# Patient Record
Sex: MALE | Race: WHITE | Employment: FULL TIME | URBAN - METROPOLITAN AREA
[De-identification: names, ages, dates, MRNs, and addresses within clinical notes are randomized per-mention and may not be internally consistent; named-entity substitution may affect disease eponyms.]

---

## 2017-04-12 ENCOUNTER — HOSPITAL ENCOUNTER (OUTPATIENT)
Age: 22
Discharge: HOME | End: 2017-04-12
Payer: MEDICAID

## 2017-04-12 LAB
ABSOLUTE EOS #: 0.5 K/UL (ref 0–0.4)
ABSOLUTE LYMPH #: 2 K/UL (ref 1–4.8)
ABSOLUTE MONO #: 0.6 K/UL (ref 0.1–1.2)
ALBUMIN SERPL-MCNC: 4.5 G/DL (ref 3.5–5.2)
ALBUMIN/GLOBULIN RATIO: 1.4 (ref 1–2.5)
ALP BLD-CCNC: 70 U/L (ref 40–129)
ALT SERPL-CCNC: 49 U/L (ref 5–41)
ANION GAP SERPL CALCULATED.3IONS-SCNC: 13 MMOL/L (ref 9–17)
AST SERPL-CCNC: 31 U/L
BASOPHILS # BLD: 1 % (ref 0–2)
BASOPHILS ABSOLUTE: 0 K/UL (ref 0–0.2)
BILIRUB SERPL-MCNC: 0.34 MG/DL (ref 0.3–1.2)
BUN BLDV-MCNC: 10 MG/DL (ref 6–20)
BUN/CREAT BLD: ABNORMAL (ref 9–20)
CALCIUM SERPL-MCNC: 9.4 MG/DL (ref 8.6–10.4)
CHLORIDE BLD-SCNC: 98 MMOL/L (ref 98–107)
CHOLESTEROL/HDL RATIO: 3.1
CHOLESTEROL: 178 MG/DL
CO2: 28 MMOL/L (ref 20–31)
CREAT SERPL-MCNC: 0.83 MG/DL (ref 0.7–1.2)
DIFFERENTIAL TYPE: ABNORMAL
EOSINOPHILS RELATIVE PERCENT: 8 % (ref 1–4)
GFR AFRICAN AMERICAN: >60 ML/MIN
GFR NON-AFRICAN AMERICAN: >60 ML/MIN
GFR SERPL CREATININE-BSD FRML MDRD: ABNORMAL ML/MIN/{1.73_M2}
GFR SERPL CREATININE-BSD FRML MDRD: ABNORMAL ML/MIN/{1.73_M2}
GLUCOSE BLD-MCNC: 95 MG/DL (ref 70–99)
HCT VFR BLD CALC: 49.4 % (ref 41–53)
HDLC SERPL-MCNC: 58 MG/DL
HEMOGLOBIN: 16.8 G/DL (ref 13.5–17.5)
LDL CHOLESTEROL: 99 MG/DL (ref 0–130)
LYMPHOCYTES # BLD: 33 % (ref 24–44)
MCH RBC QN AUTO: 31.6 PG (ref 26–34)
MCHC RBC AUTO-ENTMCNC: 34 G/DL (ref 31–37)
MCV RBC AUTO: 93 FL (ref 80–100)
MONOCYTES # BLD: 10 % (ref 2–11)
PDW BLD-RTO: 13.3 % (ref 12.5–15.4)
PLATELET # BLD: 263 K/UL (ref 140–450)
PLATELET ESTIMATE: ABNORMAL
PMV BLD AUTO: 7.3 FL (ref 6–12)
POTASSIUM SERPL-SCNC: 4 MMOL/L (ref 3.7–5.3)
RBC # BLD: 5.31 M/UL (ref 4.5–5.9)
RBC # BLD: ABNORMAL 10*6/UL
SEG NEUTROPHILS: 48 % (ref 36–66)
SEGMENTED NEUTROPHILS ABSOLUTE COUNT: 3.1 K/UL (ref 1.8–7.7)
SODIUM BLD-SCNC: 139 MMOL/L (ref 135–144)
TOTAL PROTEIN: 7.8 G/DL (ref 6.4–8.3)
TRIGL SERPL-MCNC: 107 MG/DL
VLDLC SERPL CALC-MCNC: NORMAL MG/DL (ref 1–30)
WBC # BLD: 6.3 K/UL (ref 3.5–11)
WBC # BLD: ABNORMAL 10*3/UL

## 2017-04-12 PROCEDURE — 36415 COLL VENOUS BLD VENIPUNCTURE: CPT

## 2017-04-12 PROCEDURE — 80061 LIPID PANEL: CPT

## 2017-04-12 PROCEDURE — 80053 COMPREHEN METABOLIC PANEL: CPT

## 2017-04-12 PROCEDURE — 85025 COMPLETE CBC W/AUTO DIFF WBC: CPT

## 2023-08-08 ENCOUNTER — OFFICE VISIT (OUTPATIENT)
Dept: INTERNAL MEDICINE CLINIC | Age: 28
End: 2023-08-08
Payer: COMMERCIAL

## 2023-08-08 VITALS
RESPIRATION RATE: 16 BRPM | HEIGHT: 72 IN | HEART RATE: 71 BPM | DIASTOLIC BLOOD PRESSURE: 82 MMHG | WEIGHT: 221 LBS | SYSTOLIC BLOOD PRESSURE: 131 MMHG | BODY MASS INDEX: 29.93 KG/M2

## 2023-08-08 DIAGNOSIS — Z11.4 ENCOUNTER FOR SCREENING FOR HUMAN IMMUNODEFICIENCY VIRUS (HIV): ICD-10-CM

## 2023-08-08 DIAGNOSIS — F11.20 SEVERE OPIOID USE DISORDER (HCC): Primary | ICD-10-CM

## 2023-08-08 DIAGNOSIS — B19.20 HEPATITIS C VIRUS INFECTION WITHOUT HEPATIC COMA, UNSPECIFIED CHRONICITY: ICD-10-CM

## 2023-08-08 LAB
ALCOHOL URINE: ABNORMAL
AMPHETAMINE SCREEN, URINE: ABNORMAL
BARBITURATE SCREEN, URINE: ABNORMAL
BENZODIAZEPINE SCREEN, URINE: ABNORMAL
BUPRENORPHINE URINE: ABNORMAL
COCAINE METABOLITE SCREEN URINE: ABNORMAL
FENTANYL SCREEN, URINE: ABNORMAL
GABAPENTIN SCREEN, URINE: ABNORMAL
MDMA URINE: ABNORMAL
METHADONE SCREEN, URINE: ABNORMAL
METHAMPHETAMINE, URINE: ABNORMAL
OPIATE SCREEN URINE: ABNORMAL
OXYCODONE SCREEN URINE: ABNORMAL
PHENCYCLIDINE SCREEN URINE: ABNORMAL
PROPOXYPHENE SCREEN, URINE: ABNORMAL
SYNTHETIC CANNABINOIDS(K2) SCREEN, URINE: ABNORMAL
THC SCREEN, URINE: ABNORMAL
TRAMADOL SCREEN URINE: ABNORMAL
TRICYCLIC ANTIDEPRESSANTS, UR: ABNORMAL

## 2023-08-08 PROCEDURE — 4004F PT TOBACCO SCREEN RCVD TLK: CPT | Performed by: NURSE PRACTITIONER

## 2023-08-08 PROCEDURE — 99204 OFFICE O/P NEW MOD 45 MIN: CPT | Performed by: NURSE PRACTITIONER

## 2023-08-08 PROCEDURE — 80305 DRUG TEST PRSMV DIR OPT OBS: CPT | Performed by: NURSE PRACTITIONER

## 2023-08-08 PROCEDURE — G8419 CALC BMI OUT NRM PARAM NOF/U: HCPCS | Performed by: NURSE PRACTITIONER

## 2023-08-08 PROCEDURE — G8427 DOCREV CUR MEDS BY ELIG CLIN: HCPCS | Performed by: NURSE PRACTITIONER

## 2023-08-08 RX ORDER — BUPRENORPHINE AND NALOXONE 8; 2 MG/1; MG/1
1 FILM, SOLUBLE BUCCAL; SUBLINGUAL DAILY
Qty: 7 FILM | Refills: 0 | Status: SHIPPED | OUTPATIENT
Start: 2023-08-08 | End: 2023-08-15

## 2023-08-08 RX ORDER — BUPRENORPHINE AND NALOXONE 8; 2 MG/1; MG/1
1 FILM, SOLUBLE BUCCAL; SUBLINGUAL DAILY
COMMUNITY

## 2023-08-08 RX ORDER — GABAPENTIN 300 MG/1
300 CAPSULE ORAL 3 TIMES DAILY
Qty: 21 CAPSULE | Refills: 0 | Status: SHIPPED | OUTPATIENT
Start: 2023-08-08 | End: 2023-08-15

## 2023-08-08 ASSESSMENT — ENCOUNTER SYMPTOMS
CONSTIPATION: 0
WHEEZING: 0
CHEST TIGHTNESS: 0
COUGH: 0
SHORTNESS OF BREATH: 0
DIARRHEA: 0
NAUSEA: 0
VOMITING: 0
ABDOMINAL PAIN: 0

## 2023-08-08 ASSESSMENT — PATIENT HEALTH QUESTIONNAIRE - PHQ9
5. POOR APPETITE OR OVEREATING: 0
10. IF YOU CHECKED OFF ANY PROBLEMS, HOW DIFFICULT HAVE THESE PROBLEMS MADE IT FOR YOU TO DO YOUR WORK, TAKE CARE OF THINGS AT HOME, OR GET ALONG WITH OTHER PEOPLE: 1
7. TROUBLE CONCENTRATING ON THINGS, SUCH AS READING THE NEWSPAPER OR WATCHING TELEVISION: 0
1. LITTLE INTEREST OR PLEASURE IN DOING THINGS: 2
6. FEELING BAD ABOUT YOURSELF - OR THAT YOU ARE A FAILURE OR HAVE LET YOURSELF OR YOUR FAMILY DOWN: 0
8. MOVING OR SPEAKING SO SLOWLY THAT OTHER PEOPLE COULD HAVE NOTICED. OR THE OPPOSITE, BEING SO FIGETY OR RESTLESS THAT YOU HAVE BEEN MOVING AROUND A LOT MORE THAN USUAL: 3
2. FEELING DOWN, DEPRESSED OR HOPELESS: 1
SUM OF ALL RESPONSES TO PHQ QUESTIONS 1-9: 8
9. THOUGHTS THAT YOU WOULD BE BETTER OFF DEAD, OR OF HURTING YOURSELF: 0
SUM OF ALL RESPONSES TO PHQ9 QUESTIONS 1 & 2: 3
SUM OF ALL RESPONSES TO PHQ QUESTIONS 1-9: 8
3. TROUBLE FALLING OR STAYING ASLEEP: 0
4. FEELING TIRED OR HAVING LITTLE ENERGY: 2

## 2023-08-08 NOTE — PROGRESS NOTES
Verbal order per Alma Escobar CNP for urine drug screen. Positive for BUP. Verified results with Felicia LAI LPN.

## 2023-08-08 NOTE — PROGRESS NOTES
08/08/23   The patients primary provider is BETO Moncada CNP    Chucho Cordova is a 25 y.o.  male who presents in office today for medication assisted treatment. The patient started using opiates at the age of 13. Quantity used daily: 80 dollars  Route of administration: injection  Date and time of last use: 2012  Othersubstances: denies  Prior uses of buprenorphine/naltrexone: taking suboxone 8mg daily for the last several years while incarcerated. Pt states taking sl. Psychiatric history: PTSD, bipolar 2 disorder    Pt states he use to take Elavil and gabapentin prior to incarceration  Hx of tremors and uncontrolled anxiety, pt states he has never been able to sit still and shakes constantly- reports gabapentin helped with this. He does have legs shaking and arms trembling during our conversation. He has tried zoloft with no relief  Pt states he was on several different meds while incarcerated, quit taking them all in 2022    Social hx-denies having any family or friends currently. He currently resides at the [place] [address]. Pt states both his parents and siblings were addicts. His parents passed away in 2019. Pt states he never had much of a relationship with them and hadnt spoken to them in years. He has been in halfway since 2013. He was released briefly in 2017 but went back due to a parole violation. Released this past Friday. He is on parole x 3 years- he does not know what programming or requirements he has to complete yet. Hepatitis hx: known hep C- never received treatment    Discussed at length all Medication Assisted Treatment options. Patient wishes to proceed with treatment of buprenorphine at this time. I allowed opportunity to respond to questions regarding treatment options. Pt would like to start treatment with suboxone. Patient instructed to avoid cannabis, stimulants, and other addictive drugs.   The use of benzodiazepines and other sedative

## 2023-08-10 ENCOUNTER — CLINICAL DOCUMENTATION (OUTPATIENT)
Dept: INTERNAL MEDICINE CLINIC | Age: 28
End: 2023-08-10

## 2023-08-10 NOTE — PROGRESS NOTES
Patient completed new patient screening while sw was out of the office. Sw will meet with patient at next appointment to provide support and community resources.

## 2023-08-14 ENCOUNTER — OFFICE VISIT (OUTPATIENT)
Dept: INTERNAL MEDICINE CLINIC | Age: 28
End: 2023-08-14
Payer: COMMERCIAL

## 2023-08-14 VITALS
SYSTOLIC BLOOD PRESSURE: 164 MMHG | HEART RATE: 112 BPM | HEIGHT: 72 IN | WEIGHT: 230 LBS | BODY MASS INDEX: 31.15 KG/M2 | DIASTOLIC BLOOD PRESSURE: 89 MMHG

## 2023-08-14 DIAGNOSIS — F11.20 SEVERE OPIOID USE DISORDER (HCC): Primary | ICD-10-CM

## 2023-08-14 PROCEDURE — G8427 DOCREV CUR MEDS BY ELIG CLIN: HCPCS | Performed by: NURSE PRACTITIONER

## 2023-08-14 PROCEDURE — 80305 DRUG TEST PRSMV DIR OPT OBS: CPT | Performed by: NURSE PRACTITIONER

## 2023-08-14 PROCEDURE — 4004F PT TOBACCO SCREEN RCVD TLK: CPT | Performed by: NURSE PRACTITIONER

## 2023-08-14 PROCEDURE — G8417 CALC BMI ABV UP PARAM F/U: HCPCS | Performed by: NURSE PRACTITIONER

## 2023-08-14 PROCEDURE — 99214 OFFICE O/P EST MOD 30 MIN: CPT | Performed by: NURSE PRACTITIONER

## 2023-08-14 RX ORDER — BUPRENORPHINE AND NALOXONE 8; 2 MG/1; MG/1
1 FILM, SOLUBLE BUCCAL; SUBLINGUAL DAILY
Qty: 5 FILM | Refills: 0 | Status: SHIPPED | OUTPATIENT
Start: 2023-08-14 | End: 2023-08-19

## 2023-08-14 RX ORDER — GABAPENTIN 300 MG/1
300 CAPSULE ORAL 3 TIMES DAILY
Qty: 84 CAPSULE | Refills: 0 | Status: SHIPPED | OUTPATIENT
Start: 2023-08-14 | End: 2023-09-11

## 2023-08-14 RX ORDER — BUPRENORPHINE 100 MG/1
100 SOLUTION SUBCUTANEOUS
Qty: 0.5 ML | Refills: 5 | Status: SHIPPED | OUTPATIENT
Start: 2023-08-14 | End: 2023-09-13

## 2023-08-14 NOTE — PROGRESS NOTES
Verbal order per Marietta Moralez CNP for urine drug screen. Positive for BUP. Verified results with Felicia LAI LPN.
Encounter   Procedures    POCT 615 Hira Valencia, APRN - CNP 08/14/23 4:43 PM

## 2023-08-17 ENCOUNTER — OFFICE VISIT (OUTPATIENT)
Dept: INTERNAL MEDICINE CLINIC | Age: 28
End: 2023-08-17
Payer: COMMERCIAL

## 2023-08-17 VITALS
HEART RATE: 90 BPM | DIASTOLIC BLOOD PRESSURE: 77 MMHG | WEIGHT: 222 LBS | HEIGHT: 72 IN | RESPIRATION RATE: 20 BRPM | SYSTOLIC BLOOD PRESSURE: 134 MMHG | BODY MASS INDEX: 30.07 KG/M2

## 2023-08-17 DIAGNOSIS — F11.20 SEVERE OPIOID USE DISORDER (HCC): Primary | ICD-10-CM

## 2023-08-17 PROCEDURE — 99214 OFFICE O/P EST MOD 30 MIN: CPT | Performed by: NURSE PRACTITIONER

## 2023-08-17 PROCEDURE — G8417 CALC BMI ABV UP PARAM F/U: HCPCS | Performed by: NURSE PRACTITIONER

## 2023-08-17 PROCEDURE — G8427 DOCREV CUR MEDS BY ELIG CLIN: HCPCS | Performed by: NURSE PRACTITIONER

## 2023-08-17 PROCEDURE — 4004F PT TOBACCO SCREEN RCVD TLK: CPT | Performed by: NURSE PRACTITIONER

## 2023-08-17 PROCEDURE — 80305 DRUG TEST PRSMV DIR OPT OBS: CPT | Performed by: NURSE PRACTITIONER

## 2023-08-17 NOTE — PROGRESS NOTES
Verbal order per Misael Crockett CNP for urine drug screen. Positive for BUP. Verified results with FABIAN Vera.

## 2023-08-17 NOTE — PROGRESS NOTES
08/17/23   The patients primary care physician is BETO Johnson CNP    Rowan Eubanks is a 25 y.o.  male who presents in office today for follow up medication assisted treatment, substance use disorder. Pt established care on 8/8/2023  He was just released from senior living, has been incarcerated since 2013  He resides at the [address] [place] [address] denies any urges, triggers, or cravings. Pt received his first subcutaneous buprenorphine injection in office today    UDS acceptable    Pt is attending sober meetings    Aware of need for labs    Pertinent Drug History  The patient started using opiates at the age of 13. Quantity used daily: 80 dollars  Route of administration: injection  Date and time of last use: 2012  Othersubstances: denies  Prior uses of buprenorphine/naltrexone: taking suboxone 8mg daily for the last several years while incarcerated. Pt states taking sl.   Psychiatric history: PTSD, bipolar 2 disorder  Past Medical History:   Diagnosis Date    Cyclothymic disorder     Depression     Hepatitis C     Substance abuse ([address] [facility])          Social History     Socioeconomic History    Marital status: Single     Spouse name: Not on file    Number of children: Not on file    Years of education: Not on file    Highest education level: Not on file   Occupational History    Not on file   Tobacco Use    Smoking status: Every Day     Packs/day: 1.00     Types: Cigarettes    Smokeless tobacco: Never   Substance and Sexual Activity    Alcohol use: Never    Drug use: Yes     Types: Suboxone, Heroin, Cocaine, IV    Sexual activity: Not on file   Other Topics Concern    Not on file   Social History Narrative    Not on file     Social Determinants of Health     Financial Resource Strain: Not on file   Food Insecurity: Not on file   Transportation Needs: Not on file   Physical Activity: Not on file   Stress: Not on file   Social Connections: Not on file   Intimate Partner Violence: Not on file   Housing

## 2023-08-17 NOTE — PROGRESS NOTES
Patient stated he has not consumed any Fentanyl or opiates in the last 2 weeks. Patient was educated on the risk of precipitated withdrawals symptoms that could take place with receiving the injections. Patient verbalized understanding. Consent signed at this time. Sublocade 300 mg SQ injection given into RUQ. Patient tolerated well. No adverse reaction noticed.

## 2023-08-31 ENCOUNTER — TELEMEDICINE (OUTPATIENT)
Dept: INTERNAL MEDICINE CLINIC | Age: 28
End: 2023-08-31
Payer: COMMERCIAL

## 2023-08-31 DIAGNOSIS — F11.20 OPIOID USE DISORDER, SEVERE, DEPENDENCE (HCC): Primary | ICD-10-CM

## 2023-08-31 PROCEDURE — 4004F PT TOBACCO SCREEN RCVD TLK: CPT | Performed by: NURSE PRACTITIONER

## 2023-08-31 PROCEDURE — G8428 CUR MEDS NOT DOCUMENT: HCPCS | Performed by: NURSE PRACTITIONER

## 2023-08-31 PROCEDURE — 99212 OFFICE O/P EST SF 10 MIN: CPT | Performed by: NURSE PRACTITIONER

## 2023-08-31 PROCEDURE — G8417 CALC BMI ABV UP PARAM F/U: HCPCS | Performed by: NURSE PRACTITIONER

## 2023-08-31 RX ORDER — GABAPENTIN 400 MG/1
400 CAPSULE ORAL 3 TIMES DAILY
Qty: 42 CAPSULE | Refills: 0 | Status: SHIPPED | OUTPATIENT
Start: 2023-08-31 | End: 2023-09-14

## 2023-08-31 RX ORDER — GABAPENTIN 300 MG/1
300 CAPSULE ORAL 3 TIMES DAILY
Qty: 84 CAPSULE | Refills: 0 | Status: CANCELLED | OUTPATIENT
Start: 2023-08-31 | End: 2023-09-28

## 2023-08-31 SDOH — ECONOMIC STABILITY: TRANSPORTATION INSECURITY
IN THE PAST 12 MONTHS, HAS LACK OF TRANSPORTATION KEPT YOU FROM MEETINGS, WORK, OR FROM GETTING THINGS NEEDED FOR DAILY LIVING?: NO

## 2023-08-31 SDOH — ECONOMIC STABILITY: FOOD INSECURITY: WITHIN THE PAST 12 MONTHS, YOU WORRIED THAT YOUR FOOD WOULD RUN OUT BEFORE YOU GOT MONEY TO BUY MORE.: SOMETIMES TRUE

## 2023-08-31 SDOH — ECONOMIC STABILITY: FOOD INSECURITY: WITHIN THE PAST 12 MONTHS, THE FOOD YOU BOUGHT JUST DIDN'T LAST AND YOU DIDN'T HAVE MONEY TO GET MORE.: SOMETIMES TRUE

## 2023-08-31 SDOH — ECONOMIC STABILITY: HOUSING INSECURITY
IN THE LAST 12 MONTHS, WAS THERE A TIME WHEN YOU DID NOT HAVE A STEADY PLACE TO SLEEP OR SLEPT IN A SHELTER (INCLUDING NOW)?: NO

## 2023-08-31 SDOH — ECONOMIC STABILITY: INCOME INSECURITY: HOW HARD IS IT FOR YOU TO PAY FOR THE VERY BASICS LIKE FOOD, HOUSING, MEDICAL CARE, AND HEATING?: NOT VERY HARD

## 2023-08-31 NOTE — PROGRESS NOTES
Chucho Cordova, was evaluated through a synchronous (real-time) audio-video encounter. The patient (or guardian if applicable) is aware that this is a billable service, which includes applicable co-pays. This Virtual Visit was conducted with patient's (and/or legal guardian's) consent. Patient identification was verified, and a caregiver was present when appropriate. The patient was located at Home: [address] [place] [address] Drive [address]  Provider was located at [facility] ([address] [place], [place] [address]t): [address] [place] [address],  [address] [place] [address] Alma Cordova (:  1995) is a Established patient, presenting virtually for evaluation of the following:medication assisted treatment, substance use disorder. Pt states he got a new job, full time, second shift    He resides at the [facility]    He received his first sublocade injection on . Denies any concerns related to sublocade. States tolerating medication well. He request to have gabapentin increased. States helping but doesn't seem to last long. No additonal concerns or needs voiced. F/u scheduled       Assessment & Plan   Below is the assessment and plan developed based on review of pertinent history, physical exam, labs, studies, and medications.                     --BETO Rangel - CNP

## 2023-10-09 ENCOUNTER — CLINICAL DOCUMENTATION (OUTPATIENT)
Dept: INTERNAL MEDICINE CLINIC | Age: 28
End: 2023-10-09

## 2023-10-09 RX ORDER — GABAPENTIN 400 MG/1
400 CAPSULE ORAL 3 TIMES DAILY
Qty: 42 CAPSULE | Refills: 0 | Status: SHIPPED | OUTPATIENT
Start: 2023-10-09 | End: 2023-10-23

## 2023-10-09 NOTE — PROGRESS NOTES
Sw attempted to contact patient for updated information due to missed last appointment, for patient to schedule with provider. Patient's voicemail is full. Sw sending patient message through my chart.

## 2023-10-18 ENCOUNTER — NURSE ONLY (OUTPATIENT)
Dept: LAB | Age: 28
End: 2023-10-18

## 2023-10-18 ENCOUNTER — OFFICE VISIT (OUTPATIENT)
Dept: INTERNAL MEDICINE CLINIC | Age: 28
End: 2023-10-18

## 2023-10-18 VITALS
SYSTOLIC BLOOD PRESSURE: 136 MMHG | BODY MASS INDEX: 26.99 KG/M2 | DIASTOLIC BLOOD PRESSURE: 82 MMHG | WEIGHT: 199 LBS | HEART RATE: 96 BPM | RESPIRATION RATE: 16 BRPM

## 2023-10-18 DIAGNOSIS — B19.20 HEPATITIS C VIRUS INFECTION WITHOUT HEPATIC COMA, UNSPECIFIED CHRONICITY: ICD-10-CM

## 2023-10-18 DIAGNOSIS — Z11.4 ENCOUNTER FOR SCREENING FOR HUMAN IMMUNODEFICIENCY VIRUS (HIV): ICD-10-CM

## 2023-10-18 DIAGNOSIS — F11.20 OPIOID USE DISORDER, SEVERE, DEPENDENCE (HCC): Primary | ICD-10-CM

## 2023-10-18 DIAGNOSIS — F11.20 SEVERE OPIOID USE DISORDER (HCC): ICD-10-CM

## 2023-10-18 LAB
ALBUMIN SERPL BCG-MCNC: 4.7 G/DL (ref 3.5–5.1)
ALCOHOL URINE: ABNORMAL
ALP SERPL-CCNC: 80 U/L (ref 38–126)
ALT SERPL W/O P-5'-P-CCNC: 101 U/L (ref 11–66)
AMPHETAMINE SCREEN, URINE: ABNORMAL
ANION GAP SERPL CALC-SCNC: 12 MEQ/L (ref 8–16)
AST SERPL-CCNC: 54 U/L (ref 5–40)
BARBITURATE SCREEN, URINE: ABNORMAL
BASOPHILS ABSOLUTE: 0.1 THOU/MM3 (ref 0–0.1)
BASOPHILS NFR BLD AUTO: 1.3 %
BENZODIAZEPINE SCREEN, URINE: ABNORMAL
BILIRUB SERPL-MCNC: 0.5 MG/DL (ref 0.3–1.2)
BUN SERPL-MCNC: 12 MG/DL (ref 7–22)
BUPRENORPHINE URINE: ABNORMAL
CALCIUM SERPL-MCNC: 9.4 MG/DL (ref 8.5–10.5)
CHLORIDE SERPL-SCNC: 104 MEQ/L (ref 98–111)
CO2 SERPL-SCNC: 27 MEQ/L (ref 23–33)
COCAINE METABOLITE SCREEN URINE: ABNORMAL
CREAT SERPL-MCNC: 0.8 MG/DL (ref 0.4–1.2)
DEPRECATED RDW RBC AUTO: 41.3 FL (ref 35–45)
EOSINOPHIL NFR BLD AUTO: 7.8 %
EOSINOPHILS ABSOLUTE: 0.5 THOU/MM3 (ref 0–0.4)
ERYTHROCYTE [DISTWIDTH] IN BLOOD BY AUTOMATED COUNT: 11.9 % (ref 11.5–14.5)
FENTANYL SCREEN, URINE: ABNORMAL
GABAPENTIN SCREEN, URINE: ABNORMAL
GFR SERPL CREATININE-BSD FRML MDRD: > 60 ML/MIN/1.73M2
GLUCOSE SERPL-MCNC: 77 MG/DL (ref 70–108)
HAV IGM SER QL: NEGATIVE
HBV CORE IGM SERPL QL IA: NEGATIVE
HBV SURFACE AG SERPL QL IA: NEGATIVE
HCT VFR BLD AUTO: 48.6 % (ref 42–52)
HCV IGG SERPL QL IA: ABNORMAL
HGB BLD-MCNC: 16.6 GM/DL (ref 14–18)
IMM GRANULOCYTES # BLD AUTO: 0.03 THOU/MM3 (ref 0–0.07)
IMM GRANULOCYTES NFR BLD AUTO: 0.5 %
INR PPP: 0.98 (ref 0.85–1.13)
LYMPHOCYTES ABSOLUTE: 2.2 THOU/MM3 (ref 1–4.8)
LYMPHOCYTES NFR BLD AUTO: 36.7 %
MCH RBC QN AUTO: 32.4 PG (ref 26–33)
MCHC RBC AUTO-ENTMCNC: 34.2 GM/DL (ref 32.2–35.5)
MCV RBC AUTO: 94.7 FL (ref 80–94)
MDMA URINE: ABNORMAL
METHADONE SCREEN, URINE: ABNORMAL
METHAMPHETAMINE, URINE: ABNORMAL
MONOCYTES ABSOLUTE: 0.6 THOU/MM3 (ref 0.4–1.3)
MONOCYTES NFR BLD AUTO: 10.4 %
NEUTROPHILS NFR BLD AUTO: 43.3 %
NRBC BLD AUTO-RTO: 0 /100 WBC
OPIATE SCREEN URINE: ABNORMAL
OXYCODONE SCREEN URINE: ABNORMAL
PHENCYCLIDINE SCREEN URINE: ABNORMAL
PLATELET # BLD AUTO: 242 THOU/MM3 (ref 130–400)
PMV BLD AUTO: 10 FL (ref 9.4–12.4)
POTASSIUM SERPL-SCNC: 4.2 MEQ/L (ref 3.5–5.2)
PROPOXYPHENE SCREEN, URINE: ABNORMAL
PROT SERPL-MCNC: 7.5 G/DL (ref 6.1–8)
RBC # BLD AUTO: 5.13 MILL/MM3 (ref 4.7–6.1)
SEGMENTED NEUTROPHILS ABSOLUTE COUNT: 2.6 THOU/MM3 (ref 1.8–7.7)
SODIUM SERPL-SCNC: 143 MEQ/L (ref 135–145)
SYNTHETIC CANNABINOIDS(K2) SCREEN, URINE: ABNORMAL
THC SCREEN, URINE: ABNORMAL
TRAMADOL SCREEN URINE: ABNORMAL
TRICYCLIC ANTIDEPRESSANTS, UR: ABNORMAL
WBC # BLD AUTO: 6 THOU/MM3 (ref 4.8–10.8)

## 2023-10-18 PROCEDURE — 99214 OFFICE O/P EST MOD 30 MIN: CPT | Performed by: NURSE PRACTITIONER

## 2023-10-18 PROCEDURE — 80305 DRUG TEST PRSMV DIR OPT OBS: CPT | Performed by: NURSE PRACTITIONER

## 2023-10-18 RX ORDER — GABAPENTIN 400 MG/1
400 CAPSULE ORAL 3 TIMES DAILY
Qty: 21 CAPSULE | Refills: 0 | Status: SHIPPED | OUTPATIENT
Start: 2023-10-18 | End: 2023-10-25

## 2023-10-18 RX ORDER — BUPRENORPHINE AND NALOXONE 8; 2 MG/1; MG/1
1 FILM, SOLUBLE BUCCAL; SUBLINGUAL DAILY
Qty: 7 FILM | Refills: 0 | Status: SHIPPED | OUTPATIENT
Start: 2023-10-18 | End: 2023-10-25

## 2023-10-18 NOTE — PROGRESS NOTES
Verbal order per Lexie Gillette CNP for urine drug screen. Positive for BUP LENORA. Verified results with Felicia LAI LPN.

## 2023-10-18 NOTE — PROGRESS NOTES
10/18/23   The patients primary care physician is BETO Car CNP    Derek Lui is a 25 y.o.  male who presents in office today for follow up medication assisted treatment, substance use disorder. Pt established care on 8/8/2023. I have not seen him since 8/17  He was just released from senior care, has been incarcerated since 2013  States he was working full time, staying sober. He was kicked out of the [place] [address] and then had no where to go. He stayed at [employer] [facility] and started using coke, got arrested\" and back at the [place] [address]  He is currently on parole. He received his first sublocade injection on 8/17. Pt states he did not like the shot, felt dope sick. Refuses to get back on it and states he wants the strips back. Education provided, cont to refuse. Denies any use of opiates but admits to crack/cocaine. Last used 3 days ago. Pt states he regrets it and wants to get back on track. UDS Positive for BUP LENORA      Pertinent Drug History  The patient started using opiates at the age of 13. Quantity used daily: 80 dollars  Route of administration: injection  Date and time of last use: opiates-2012  Othersubstances: denies  Prior uses of buprenorphine/naltrexone: taking suboxone 8mg daily for the last several years while incarcerated. Pt states taking sl.   Psychiatric history: PTSD, bipolar 2 disorder  Past Medical History:   Diagnosis Date    Cyclothymic disorder     Depression     Hepatitis C     Substance abuse ([address])          Social History     Socioeconomic History    Marital status: Single     Spouse name: Not on file    Number of children: Not on file    Years of education: Not on file    Highest education level: Not on file   Occupational History    Not on file   Tobacco Use    Smoking status: Every Day     Packs/day: 1     Types: Cigarettes    Smokeless tobacco: Never   Substance and Sexual Activity    Alcohol use: Never    Drug use: Yes     Types:

## 2023-10-20 LAB
HCV RNA SERPL NAA+PROBE-ACNC: ABNORMAL IU/ML
HCV RNA SERPL NAA+PROBE-LOG IU: 6.54 LOG IU/ML
HCV RNA SERPL QL NAA+PROBE: DETECTED
HIV 1+2 AB+HIV1 P24 AG SERPL QL IA: NONREACTIVE

## 2023-10-21 LAB
A2 MACROGLOB SERPL-MCNC: 187 MG/DL (ref 131–293)
ALT SERPL-CCNC: 118 U/L (ref 5–50)
ANNOTATION COMMENT IMP: ABNORMAL
AST SERPL-CCNC: 61 U/L (ref 9–50)
BUN SERPL-MCNC: 12 MG/DL (ref 7–20)
FIBROSIS STAGE SERPL QL: ABNORMAL
GGT SERPL-CCNC: 65 U/L (ref 7–51)
LIVER FIBR SCORE SERPL CALC.FIBROMETER: 0.31
PATHOLOGY STUDY: ABNORMAL
PLATELET # BLD: 246 K/UL
PT INDEX PPP: 92 % (ref 90–120)
REF LAB TEST RESULTS: 0.01
REF LAB TEST RESULTS: 0.49
REF LAB TEST RESULTS: ABNORMAL

## 2023-10-23 LAB — HCV GENTYP SERPL NAA+PROBE: NORMAL

## 2023-10-25 ENCOUNTER — CLINICAL DOCUMENTATION (OUTPATIENT)
Dept: INTERNAL MEDICINE CLINIC | Age: 28
End: 2023-10-25

## 2023-10-25 NOTE — PROGRESS NOTES
Sw was informed that patient had utilized MyChart to reach out to the office last night after hours. Patient states in the message that he his in need of more intensive treatment. Patient did not come to his appointment this morning. Sw attempted to make contact with patient. Sw left patient a voicemail for patient to call back to hyacinth.

## 2023-10-26 ENCOUNTER — OFFICE VISIT (OUTPATIENT)
Dept: INTERNAL MEDICINE CLINIC | Age: 28
End: 2023-10-26
Payer: COMMERCIAL

## 2023-10-26 VITALS
HEIGHT: 72 IN | WEIGHT: 206 LBS | BODY MASS INDEX: 27.9 KG/M2 | DIASTOLIC BLOOD PRESSURE: 78 MMHG | SYSTOLIC BLOOD PRESSURE: 133 MMHG | HEART RATE: 100 BPM

## 2023-10-26 DIAGNOSIS — F11.20 OPIOID USE DISORDER, SEVERE, DEPENDENCE (HCC): Primary | ICD-10-CM

## 2023-10-26 DIAGNOSIS — F43.10 PTSD (POST-TRAUMATIC STRESS DISORDER): ICD-10-CM

## 2023-10-26 DIAGNOSIS — F31.81 BIPOLAR II DISORDER WITH RAPID CYCLING (HCC): ICD-10-CM

## 2023-10-26 PROCEDURE — G8417 CALC BMI ABV UP PARAM F/U: HCPCS | Performed by: NURSE PRACTITIONER

## 2023-10-26 PROCEDURE — 4004F PT TOBACCO SCREEN RCVD TLK: CPT | Performed by: NURSE PRACTITIONER

## 2023-10-26 PROCEDURE — G8428 CUR MEDS NOT DOCUMENT: HCPCS | Performed by: NURSE PRACTITIONER

## 2023-10-26 PROCEDURE — G8484 FLU IMMUNIZE NO ADMIN: HCPCS | Performed by: NURSE PRACTITIONER

## 2023-10-26 PROCEDURE — 99214 OFFICE O/P EST MOD 30 MIN: CPT | Performed by: NURSE PRACTITIONER

## 2023-10-26 PROCEDURE — 80305 DRUG TEST PRSMV DIR OPT OBS: CPT | Performed by: NURSE PRACTITIONER

## 2023-10-26 RX ORDER — GABAPENTIN 400 MG/1
400 CAPSULE ORAL 3 TIMES DAILY
Qty: 21 CAPSULE | Refills: 0 | Status: SHIPPED | OUTPATIENT
Start: 2023-10-26 | End: 2023-11-02

## 2023-10-26 RX ORDER — BUPRENORPHINE AND NALOXONE 8; 2 MG/1; MG/1
1 FILM, SOLUBLE BUCCAL; SUBLINGUAL DAILY
Qty: 7 FILM | Refills: 0 | Status: SHIPPED | OUTPATIENT
Start: 2023-10-26 | End: 2023-11-02

## 2023-10-26 NOTE — PROGRESS NOTES
10/26/23   The patients primary care physician is Tessy Willis, APRN - [facility]    Yarelis Ceja is a 25 y.o.  male who presents in office today for follow up medication assisted treatment, substance use disorder. Pt established care on 2023. Pt continues to reside at the [place] [address]. He admits to \"Feeling up and down\"   States hx bipolar and ptsd  States he \"flip flops from manic to depressed\"  Use to take elavil and Neurontin. States everything else made him tired all day. Fearful if he confides into staff he will get kicked out of the house and go to [facility]. Pt encouraged to start counseling services. In need of psychiatry to manage mental health medications. He declines any new medications in office today other than elavil and gabapentin. I do not feel this is appropriate tx if pt is dx bipolar disorder. Jose Alejandro Abraham will speak with pt and assist with additional care. UDS unacceptable- admits to smoking crack, unknown meth or fentanyl use  \" I expected the meth but not the faith, my dad  like that\"     He is attending sober meetings several days of the week    Pt does voice desire to stay sober. He called in on 10/25 asking for help but then missed his appt yesterday    Pertinent Drug History  The patient started using opiates at the age of 13. Quantity used daily: 80 dollars  Route of administration: injection  Date and time of last use: opiates-- incarcerated from  to   Othersubstances: denies  Prior uses of buprenorphine/naltrexone: taking suboxone 8mg daily for the last several years while incarcerated. Pt states taking sl.   Psychiatric history: PTSD, bipolar 2 disorder  Past Medical History:   Diagnosis Date    Cyclothymic disorder     Depression     Hepatitis C     Substance abuse ([address] )          Social History     Socioeconomic History    Marital status: Single     Spouse name: Not on file    Number of children: Not on file    Years of education: Not on file

## 2023-10-26 NOTE — PROGRESS NOTES
Sw met with patient post appointment. Sw offered resources for patient to engage in psych services. Sw offered to assist patient in deciding what agency in town would be the best resource for him. Sw provided patient with information for groups starting in office, he was agreeable to starting. Ynes provided [employer] employee his information for him to get started.

## 2023-10-26 NOTE — PROGRESS NOTES
Verbal order per Marietta Moralez CNP  for urine drug screen. Positive for BUP LENORA METH FTY. Verified results with Felicia LAI LPN.

## 2023-11-14 ENCOUNTER — OFFICE VISIT (OUTPATIENT)
Dept: INTERNAL MEDICINE CLINIC | Age: 28
End: 2023-11-14
Payer: COMMERCIAL

## 2023-11-14 ENCOUNTER — TELEPHONE (OUTPATIENT)
Dept: INTERNAL MEDICINE CLINIC | Age: 28
End: 2023-11-14

## 2023-11-14 VITALS
BODY MASS INDEX: 28.7 KG/M2 | HEIGHT: 71 IN | SYSTOLIC BLOOD PRESSURE: 135 MMHG | HEART RATE: 88 BPM | RESPIRATION RATE: 16 BRPM | WEIGHT: 205 LBS | DIASTOLIC BLOOD PRESSURE: 76 MMHG

## 2023-11-14 DIAGNOSIS — F11.20 OPIOID USE DISORDER, SEVERE, DEPENDENCE (HCC): Primary | ICD-10-CM

## 2023-11-14 PROCEDURE — 80305 DRUG TEST PRSMV DIR OPT OBS: CPT | Performed by: NURSE PRACTITIONER

## 2023-11-14 PROCEDURE — G8484 FLU IMMUNIZE NO ADMIN: HCPCS | Performed by: NURSE PRACTITIONER

## 2023-11-14 PROCEDURE — 4004F PT TOBACCO SCREEN RCVD TLK: CPT | Performed by: NURSE PRACTITIONER

## 2023-11-14 PROCEDURE — G8427 DOCREV CUR MEDS BY ELIG CLIN: HCPCS | Performed by: NURSE PRACTITIONER

## 2023-11-14 PROCEDURE — G8417 CALC BMI ABV UP PARAM F/U: HCPCS | Performed by: NURSE PRACTITIONER

## 2023-11-14 PROCEDURE — 99214 OFFICE O/P EST MOD 30 MIN: CPT | Performed by: NURSE PRACTITIONER

## 2023-11-14 RX ORDER — BUPRENORPHINE AND NALOXONE 8; 2 MG/1; MG/1
1 FILM, SOLUBLE BUCCAL; SUBLINGUAL DAILY
Qty: 7 FILM | Refills: 0 | Status: CANCELLED | OUTPATIENT
Start: 2023-11-14 | End: 2023-11-21

## 2023-11-14 RX ORDER — AMITRIPTYLINE HYDROCHLORIDE 50 MG/1
50 TABLET, FILM COATED ORAL NIGHTLY
Qty: 30 TABLET | Refills: 0 | Status: SHIPPED | OUTPATIENT
Start: 2023-11-14 | End: 2023-12-14

## 2023-11-14 RX ORDER — GABAPENTIN 400 MG/1
400 CAPSULE ORAL 3 TIMES DAILY
Qty: 90 CAPSULE | Refills: 0 | Status: SHIPPED | OUTPATIENT
Start: 2023-11-14 | End: 2023-12-14

## 2023-11-14 NOTE — TELEPHONE ENCOUNTER
Spoke with nurse at [facility], pt had  300mg sublocade injection on 11/6/23 it was his first injection

## 2023-11-14 NOTE — PROGRESS NOTES
11/14/23   The patients primary care physician is BETO Whitfield CNP    Marielle Benton is a 25 y.o.  male who presents in office today for follow up medication assisted treatment, substance use disorder. Pt established care on 8/8/2023  [facility], has been incarcerated since 2013  He resides at the [address] [place] [address] states he is trying to get into a treatment center in [place]. [facility]. States he received sublocade but believed it was only 100mg  We called to verify- 300mg buprenorphine was given sq on 11/6  Pt states dillan continued his gabapentin and then added his elavil back at night to sleep. Pt states this works much better than trazodone and he dont wake up drowsy or feeling hung over after taking the elavil    He had previously received one injection of sublocade on 8/17 and then refused any further shots- went back to sl suboxone    Pt denies any urges, triggers, or cravings. UDS acceptable  [facility] report returned on 11/16 from this visit positive cocaine >6400    Hep C +  with active viral load, genotype 3a, fibrosis metavir f1  Pt agrees to treatment of hepatitis C. Pt does not have a short term life expectancy. Aware of contraindications for tx. Pt instructed the most common side effects are fatigue and headaches. Routine labs and testing discussed. Will follow in 4 weeks to see how treatment is being tolerated. Once medication is initiated, labs are recommended at 4 weeks and 12 weeks to determine effectiveness and cure. Pertinent Drug History  The patient started using opiates at the age of 13. Quantity used daily: 80 dollars  Route of administration: injection  Date and time of last use: opiates-2012  Othersubstances: denies  Prior uses of buprenorphine/naltrexone: taking suboxone 8mg daily for the last several years while incarcerated. Pt states taking sl.   Psychiatric history: PTSD, bipolar 2

## 2023-11-14 NOTE — PROGRESS NOTES
Verbal order per Chapman Shone CNP for urine drug screen. Positive for BUP, LENORA. Verified results with Jroge Luis Charles LPN.

## 2023-12-04 ENCOUNTER — OFFICE VISIT (OUTPATIENT)
Dept: INTERNAL MEDICINE CLINIC | Age: 28
End: 2023-12-04
Payer: COMMERCIAL

## 2023-12-04 DIAGNOSIS — F11.20 OPIOID USE DISORDER, SEVERE, DEPENDENCE (HCC): ICD-10-CM

## 2023-12-04 PROCEDURE — G8427 DOCREV CUR MEDS BY ELIG CLIN: HCPCS | Performed by: NURSE PRACTITIONER

## 2023-12-04 PROCEDURE — G8484 FLU IMMUNIZE NO ADMIN: HCPCS | Performed by: NURSE PRACTITIONER

## 2023-12-04 PROCEDURE — 80305 DRUG TEST PRSMV DIR OPT OBS: CPT | Performed by: NURSE PRACTITIONER

## 2023-12-04 PROCEDURE — 4004F PT TOBACCO SCREEN RCVD TLK: CPT | Performed by: NURSE PRACTITIONER

## 2023-12-04 PROCEDURE — G8417 CALC BMI ABV UP PARAM F/U: HCPCS | Performed by: NURSE PRACTITIONER

## 2023-12-04 PROCEDURE — 99214 OFFICE O/P EST MOD 30 MIN: CPT | Performed by: NURSE PRACTITIONER

## 2023-12-04 RX ORDER — BUPRENORPHINE AND NALOXONE 8; 2 MG/1; MG/1
1 FILM, SOLUBLE BUCCAL; SUBLINGUAL DAILY
Qty: 7 FILM | Refills: 0 | Status: CANCELLED | OUTPATIENT
Start: 2023-12-04 | End: 2023-12-11

## 2023-12-04 RX ORDER — GABAPENTIN 400 MG/1
400 CAPSULE ORAL 3 TIMES DAILY
Qty: 84 CAPSULE | Refills: 0 | Status: SHIPPED | OUTPATIENT
Start: 2023-12-04 | End: 2024-01-01

## 2023-12-04 NOTE — PROGRESS NOTES
12/04/23   The patients primary care physician is BETO High - [facility]    Lolly Carson is a 25 y.o.  male who presents in office today for follow up medication assisted treatment, substance use disorder. Pt established care on 8/8/2023  He was [facility] penitentiary, has been incarcerated since 2013    He presents in office today for subcutaneous buprenorphine    He is at the [facility] now- inpt programming  Pt states he is feeling so much better, believes this is the right program for him. He has been sober of all illicit substances for 10 days. Pt denies any urges, triggers, or cravings. UDS acceptable    Hep C - Mavyrett given to pt in office today    Pertinent Drug History  The patient started using opiates at the age of 13. Quantity used daily: 80 dollars  Route of administration: injection  Date and time of last use: opiates-2012  Othersubstances: denies  Prior uses of buprenorphine/naltrexone: taking suboxone 8mg daily for the last several years while incarcerated. Pt states taking sl.   Psychiatric history: PTSD, bipolar 2 disorder  Past Medical History:   Diagnosis Date    Cyclothymic disorder     Depression     Hepatitis C     Substance abuse ([address])          Social History     Socioeconomic History    Marital status: Single     Spouse name: Not on file    Number of children: Not on file    Years of education: Not on file    Highest education level: Not on file   Occupational History    Not on file   Tobacco Use    Smoking status: Every Day     Packs/day: 1     Types: Cigarettes    Smokeless tobacco: Never   Substance and Sexual Activity    Alcohol use: Never    Drug use: Yes     Types: Suboxone, Heroin, Cocaine, IV    Sexual activity: Not on file   Other Topics Concern    Not on file   Social History Narrative    Not on file     Social Determinants of Health     Financial Resource Strain: Not on file   Food Insecurity: Not on file (8/31/2023)   Transportation Needs: Not on

## 2023-12-11 RX ORDER — GABAPENTIN 400 MG/1
400 CAPSULE ORAL 3 TIMES DAILY
Qty: 84 CAPSULE | Refills: 0 | OUTPATIENT
Start: 2023-12-11 | End: 2024-01-08

## 2023-12-11 RX ORDER — AMITRIPTYLINE HYDROCHLORIDE 50 MG/1
50 TABLET, FILM COATED ORAL NIGHTLY
Qty: 30 TABLET | Refills: 0 | Status: SHIPPED | OUTPATIENT
Start: 2023-12-11 | End: 2024-01-10

## 2024-01-02 ENCOUNTER — OFFICE VISIT (OUTPATIENT)
Dept: INTERNAL MEDICINE CLINIC | Age: 29
End: 2024-01-02
Payer: COMMERCIAL

## 2024-01-02 VITALS
SYSTOLIC BLOOD PRESSURE: 137 MMHG | DIASTOLIC BLOOD PRESSURE: 88 MMHG | HEART RATE: 92 BPM | RESPIRATION RATE: 16 BRPM | BODY MASS INDEX: 29.68 KG/M2 | WEIGHT: 212 LBS

## 2024-01-02 DIAGNOSIS — F11.20 OPIOID USE DISORDER, SEVERE, DEPENDENCE (HCC): Primary | ICD-10-CM

## 2024-01-02 DIAGNOSIS — J40 BRONCHITIS: ICD-10-CM

## 2024-01-02 PROCEDURE — 80305 DRUG TEST PRSMV DIR OPT OBS: CPT | Performed by: NURSE PRACTITIONER

## 2024-01-02 PROCEDURE — G8484 FLU IMMUNIZE NO ADMIN: HCPCS | Performed by: NURSE PRACTITIONER

## 2024-01-02 PROCEDURE — 4004F PT TOBACCO SCREEN RCVD TLK: CPT | Performed by: NURSE PRACTITIONER

## 2024-01-02 PROCEDURE — G8417 CALC BMI ABV UP PARAM F/U: HCPCS | Performed by: NURSE PRACTITIONER

## 2024-01-02 PROCEDURE — 99214 OFFICE O/P EST MOD 30 MIN: CPT | Performed by: NURSE PRACTITIONER

## 2024-01-02 PROCEDURE — G8427 DOCREV CUR MEDS BY ELIG CLIN: HCPCS | Performed by: NURSE PRACTITIONER

## 2024-01-02 RX ORDER — PREDNISONE 50 MG/1
50 TABLET ORAL DAILY
Qty: 5 TABLET | Refills: 0 | Status: SHIPPED | OUTPATIENT
Start: 2024-01-02 | End: 2024-01-07

## 2024-01-02 RX ORDER — AMITRIPTYLINE HYDROCHLORIDE 50 MG/1
50 TABLET, FILM COATED ORAL NIGHTLY
Qty: 28 TABLET | Refills: 0 | Status: SHIPPED | OUTPATIENT
Start: 2024-01-02 | End: 2024-01-30

## 2024-01-02 RX ORDER — BUPRENORPHINE AND NALOXONE 8; 2 MG/1; MG/1
1 FILM, SOLUBLE BUCCAL; SUBLINGUAL DAILY
Qty: 7 FILM | Refills: 0 | Status: SHIPPED | OUTPATIENT
Start: 2024-01-02 | End: 2024-01-09

## 2024-01-02 RX ORDER — ALBUTEROL SULFATE 90 UG/1
2 AEROSOL, METERED RESPIRATORY (INHALATION) 4 TIMES DAILY PRN
Qty: 18 G | Refills: 0 | Status: SHIPPED | OUTPATIENT
Start: 2024-01-02

## 2024-01-02 RX ORDER — GABAPENTIN 400 MG/1
400 CAPSULE ORAL 3 TIMES DAILY
Qty: 84 CAPSULE | Refills: 0 | Status: SHIPPED | OUTPATIENT
Start: 2024-01-02 | End: 2024-01-30

## 2024-01-02 NOTE — PROGRESS NOTES
Verbal order per VANDANA DANGELO CNP for urine drug screen. Positive for LENORA, BUP. Can't test for Fent at this time.
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
## 2024-01-10 ENCOUNTER — OFFICE VISIT (OUTPATIENT)
Dept: INTERNAL MEDICINE CLINIC | Age: 29
End: 2024-01-10
Payer: COMMERCIAL

## 2024-01-10 VITALS
SYSTOLIC BLOOD PRESSURE: 132 MMHG | HEART RATE: 91 BPM | WEIGHT: 217 LBS | RESPIRATION RATE: 16 BRPM | BODY MASS INDEX: 30.38 KG/M2 | DIASTOLIC BLOOD PRESSURE: 79 MMHG

## 2024-01-10 DIAGNOSIS — J40 BRONCHITIS: ICD-10-CM

## 2024-01-10 DIAGNOSIS — F11.20 OPIOID USE DISORDER, SEVERE, DEPENDENCE (HCC): Primary | ICD-10-CM

## 2024-01-10 PROCEDURE — G8484 FLU IMMUNIZE NO ADMIN: HCPCS | Performed by: NURSE PRACTITIONER

## 2024-01-10 PROCEDURE — G8417 CALC BMI ABV UP PARAM F/U: HCPCS | Performed by: NURSE PRACTITIONER

## 2024-01-10 PROCEDURE — 99204 OFFICE O/P NEW MOD 45 MIN: CPT | Performed by: NURSE PRACTITIONER

## 2024-01-10 PROCEDURE — 80305 DRUG TEST PRSMV DIR OPT OBS: CPT | Performed by: NURSE PRACTITIONER

## 2024-01-10 PROCEDURE — G8428 CUR MEDS NOT DOCUMENT: HCPCS | Performed by: NURSE PRACTITIONER

## 2024-01-10 PROCEDURE — 4004F PT TOBACCO SCREEN RCVD TLK: CPT | Performed by: NURSE PRACTITIONER

## 2024-01-10 RX ORDER — BUPRENORPHINE 64 MG/.18ML
64 INJECTION SUBCUTANEOUS
Qty: 0.18 ML | Refills: 5 | Status: SHIPPED | OUTPATIENT
Start: 2024-01-10 | End: 2024-02-09

## 2024-01-10 RX ORDER — BUPRENORPHINE AND NALOXONE 8; 2 MG/1; MG/1
1 FILM, SOLUBLE BUCCAL; SUBLINGUAL DAILY
Qty: 14 FILM | Refills: 0 | Status: SHIPPED | OUTPATIENT
Start: 2024-01-10 | End: 2024-01-24

## 2024-01-10 RX ORDER — GUAIFENESIN 600 MG/1
1200 TABLET, EXTENDED RELEASE ORAL 2 TIMES DAILY
Qty: 40 TABLET | Refills: 0 | Status: SHIPPED | OUTPATIENT
Start: 2024-01-10 | End: 2024-01-20

## 2024-01-10 NOTE — PROGRESS NOTES
01/10/24   The patients primary care physician is Olga Hyatt, BETO - [facility]    Ronal Hays is a 28 y.o.  male who presents in office today for follow up medication assisted treatment, substance use disorder.   Pt established care on 8/8/2023  He was released from [facility], has been incarcerated since 2013    Pt denies any urges, triggers, or cravings.     UDS bup only   Previous UDS was + cocaine  Admitted to recent cocaine and fentanyl use  He denies any use since previous visit    Pt reports continued non productive cough and congestion  Albuterol and prednisone given on 1/2 with no relief of symptoms  No reports of dypsnea, CP, or fever  Lungs with scattered rhonchi    Interview today for [employer]    He continues outpt programming with the [employer]- resides at the [facility]    Plan for subcutaneous buprenorphine at next visit per pt request    Pertinent Drug History  The patient started using opiates at the age of 11.   Quantity used daily: 80 dollars  Route of administration: injection  Date and time of last use: opiates-2012  Othersubstances: denies  Prior uses of buprenorphine/naltrexone: taking suboxone 8mg daily for the last several years while incarcerated. Pt states taking sl.  Psychiatric history: PTSD, bipolar 2 disorder  Past Medical History:   Diagnosis Date    Cyclothymic disorder     Depression     Hepatitis C     Substance abuse (HCC)          Social History     Socioeconomic History    Marital status: Single     Spouse name: Not on file    Number of children: Not on file    Years of education: Not on file    Highest education level: Not on file   Occupational History    Not on file   Tobacco Use    Smoking status: Every Day     Current packs/day: 1.00     Types: Cigarettes    Smokeless tobacco: Never   Substance and Sexual Activity    Alcohol use: Never    Drug use: Yes     Types: Suboxone, Heroin, Cocaine, IV    Sexual activity: Not on file   Other Topics Concern    Not on file

## 2024-01-10 NOTE — PROGRESS NOTES
Verbal order per VANDANA DANGELO CNP for urine drug screen. Positive for BUP. Verified results with LIN COUGHLIN LPN.

## 2024-01-15 RX ORDER — ALBUTEROL SULFATE 90 UG/1
2 AEROSOL, METERED RESPIRATORY (INHALATION) 4 TIMES DAILY PRN
Qty: 18 G | Refills: 0 | Status: SHIPPED | OUTPATIENT
Start: 2024-01-15

## 2024-01-24 ENCOUNTER — OFFICE VISIT (OUTPATIENT)
Dept: INTERNAL MEDICINE CLINIC | Age: 29
End: 2024-01-24
Payer: COMMERCIAL

## 2024-01-24 VITALS
DIASTOLIC BLOOD PRESSURE: 75 MMHG | SYSTOLIC BLOOD PRESSURE: 136 MMHG | BODY MASS INDEX: 31.21 KG/M2 | HEIGHT: 70 IN | HEART RATE: 91 BPM | WEIGHT: 218 LBS

## 2024-01-24 DIAGNOSIS — F11.20 OPIOID USE DISORDER, SEVERE, DEPENDENCE (HCC): Primary | ICD-10-CM

## 2024-01-24 PROCEDURE — G8417 CALC BMI ABV UP PARAM F/U: HCPCS | Performed by: NURSE PRACTITIONER

## 2024-01-24 PROCEDURE — 4004F PT TOBACCO SCREEN RCVD TLK: CPT | Performed by: NURSE PRACTITIONER

## 2024-01-24 PROCEDURE — G8484 FLU IMMUNIZE NO ADMIN: HCPCS | Performed by: NURSE PRACTITIONER

## 2024-01-24 PROCEDURE — G8428 CUR MEDS NOT DOCUMENT: HCPCS | Performed by: NURSE PRACTITIONER

## 2024-01-24 PROCEDURE — 80305 DRUG TEST PRSMV DIR OPT OBS: CPT | Performed by: NURSE PRACTITIONER

## 2024-01-24 PROCEDURE — 99214 OFFICE O/P EST MOD 30 MIN: CPT | Performed by: NURSE PRACTITIONER

## 2024-01-24 RX ORDER — BUPRENORPHINE AND NALOXONE 8; 2 MG/1; MG/1
1 FILM, SOLUBLE BUCCAL; SUBLINGUAL DAILY
Qty: 7 FILM | Refills: 0 | Status: SHIPPED | OUTPATIENT
Start: 2024-01-24 | End: 2024-01-31

## 2024-01-24 NOTE — PROGRESS NOTES
Verbal order per Lily GONZALEZ CNP for urine drug screen. Positive for BUP LENORA FTY. Verified results with Felicia LAI LPN.

## 2024-01-24 NOTE — PROGRESS NOTES
01/24/24   The patients primary care physician is Olga Hyatt, APRN - CNP    Ronal Hays is a 28 y.o.  male who presents in office today for follow up medication assisted treatment, substance use disorder.   Pt established care on 8/8/2023  He was [facility] from [facility], has been incarcerated since 2013    UDS unacceptable- Positive for BUP LENORA FTY   Pt admits to smoking crack 3 days ago- states he was at a job and his coworkers were using so he took part. States \"once you see it,its stephanie hard not to\"   The last 3 UDS have been positive and pt admits to continued use.   Pt states his depression is getting the best of him. He starts counseling today, in programming through the [employer].   He resides at the [facility] house.   -discussed potential for precipitated withdrawal and death with continued use  I discussed safety concerns.  The use of benzodiazepines, alcohol, and other hypnotics should be avoided. Co-administration of any illicit substance, as well as non-medical use of drugs legally available, with buprenorphine increases the risk of serious side effects including respiratory failure, precipitated withdrawal, overdose, and/or death.      Joe started in early Dec. States on the week now      Pertinent Drug History  The patient started using opiates at the age of 11.   Quantity used daily: 80 dollars  Route of administration: injection  Date and time of last use: opiates-2012  Othersubstances: denies  Prior uses of buprenorphine/naltrexone: taking suboxone 8mg daily for the last several years while incarcerated. Pt states taking sl.  Psychiatric history: PTSD, bipolar 2 disorder  Past Medical History:   Diagnosis Date    Cyclothymic disorder     Depression     Hepatitis C     Substance abuse (HCC)          Social History     Socioeconomic History    Marital status: Single     Spouse name: Not on file    Number of children: Not on file    Years of education: Not on file    Highest

## 2024-02-07 ENCOUNTER — TELEPHONE (OUTPATIENT)
Dept: INTERNAL MEDICINE CLINIC | Age: 29
End: 2024-02-07
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
## 2024-04-09 RX ORDER — ALBUTEROL SULFATE 90 UG/1
2 AEROSOL, METERED RESPIRATORY (INHALATION) 4 TIMES DAILY PRN
Qty: 18 G | Refills: 0 | OUTPATIENT
Start: 2024-04-09

## 2024-04-09 RX ORDER — AMITRIPTYLINE HYDROCHLORIDE 50 MG/1
50 TABLET, FILM COATED ORAL NIGHTLY
Qty: 28 TABLET | Refills: 0 | OUTPATIENT
Start: 2024-04-09 | End: 2024-05-07

## 2024-04-09 RX ORDER — GABAPENTIN 400 MG/1
400 CAPSULE ORAL 3 TIMES DAILY
Qty: 84 CAPSULE | Refills: 0 | OUTPATIENT
Start: 2024-04-09 | End: 2024-05-07

## 2024-06-19 ENCOUNTER — SOCIAL WORK (OUTPATIENT)
Dept: INTERNAL MEDICINE CLINIC | Age: 29
End: 2024-06-19

## 2024-06-19 NOTE — PROGRESS NOTES
Patient contacted office to re-establish services for MAT treatment. Patient was last seen by Lily Barrientos CNP 1/24. Patient reports that he did transfer to [facility] for a while, received a sublocade injection in  April 2024.  Patient denied new use since last time seen in office.  Patient is  not using at this time, he is not working at this time, and no new medical concerns.  Patient was scheduled for 8:15 A on 6/20

## 2024-06-20 ENCOUNTER — NURSE ONLY (OUTPATIENT)
Dept: INTERNAL MEDICINE CLINIC | Age: 29
End: 2024-06-20

## 2024-06-20 ENCOUNTER — OFFICE VISIT (OUTPATIENT)
Dept: INTERNAL MEDICINE CLINIC | Age: 29
End: 2024-06-20
Payer: COMMERCIAL

## 2024-06-20 VITALS
HEART RATE: 109 BPM | RESPIRATION RATE: 22 BRPM | SYSTOLIC BLOOD PRESSURE: 167 MMHG | DIASTOLIC BLOOD PRESSURE: 97 MMHG | WEIGHT: 193 LBS | BODY MASS INDEX: 27.63 KG/M2 | HEIGHT: 70 IN

## 2024-06-20 DIAGNOSIS — F11.20 OPIOID USE DISORDER, SEVERE, DEPENDENCE (HCC): Primary | ICD-10-CM

## 2024-06-20 DIAGNOSIS — B19.20 HEPATITIS C VIRUS INFECTION WITHOUT HEPATIC COMA, UNSPECIFIED CHRONICITY: ICD-10-CM

## 2024-06-20 PROCEDURE — G8427 DOCREV CUR MEDS BY ELIG CLIN: HCPCS | Performed by: NURSE PRACTITIONER

## 2024-06-20 PROCEDURE — G8417 CALC BMI ABV UP PARAM F/U: HCPCS | Performed by: NURSE PRACTITIONER

## 2024-06-20 PROCEDURE — 4004F PT TOBACCO SCREEN RCVD TLK: CPT | Performed by: NURSE PRACTITIONER

## 2024-06-20 PROCEDURE — 80305 DRUG TEST PRSMV DIR OPT OBS: CPT | Performed by: NURSE PRACTITIONER

## 2024-06-20 PROCEDURE — 99214 OFFICE O/P EST MOD 30 MIN: CPT | Performed by: NURSE PRACTITIONER

## 2024-06-20 RX ORDER — ALBUTEROL SULFATE 90 UG/1
2 AEROSOL, METERED RESPIRATORY (INHALATION) 4 TIMES DAILY PRN
Qty: 18 G | Refills: 0 | Status: SHIPPED | OUTPATIENT
Start: 2024-06-20

## 2024-06-20 RX ORDER — GABAPENTIN 400 MG/1
400 CAPSULE ORAL 3 TIMES DAILY
Qty: 21 CAPSULE | Refills: 0 | Status: SHIPPED | OUTPATIENT
Start: 2024-06-20 | End: 2024-06-27

## 2024-06-20 RX ORDER — AMITRIPTYLINE HYDROCHLORIDE 50 MG/1
50 TABLET, FILM COATED ORAL NIGHTLY
Qty: 7 TABLET | Refills: 0 | Status: SHIPPED | OUTPATIENT
Start: 2024-06-20 | End: 2024-06-27

## 2024-06-20 RX ORDER — BUPRENORPHINE AND NALOXONE 8; 2 MG/1; MG/1
1 FILM, SOLUBLE BUCCAL; SUBLINGUAL DAILY
Qty: 7 FILM | Refills: 0 | Status: SHIPPED | OUTPATIENT
Start: 2024-06-20 | End: 2024-06-27

## 2024-06-20 ASSESSMENT — PATIENT HEALTH QUESTIONNAIRE - PHQ9
SUM OF ALL RESPONSES TO PHQ QUESTIONS 1-9: 19
3. TROUBLE FALLING OR STAYING ASLEEP: NEARLY EVERY DAY
1. LITTLE INTEREST OR PLEASURE IN DOING THINGS: MORE THAN HALF THE DAYS
10. IF YOU CHECKED OFF ANY PROBLEMS, HOW DIFFICULT HAVE THESE PROBLEMS MADE IT FOR YOU TO DO YOUR WORK, TAKE CARE OF THINGS AT HOME, OR GET ALONG WITH OTHER PEOPLE: SOMEWHAT DIFFICULT
9. THOUGHTS THAT YOU WOULD BE BETTER OFF DEAD, OR OF HURTING YOURSELF: NOT AT ALL
2. FEELING DOWN, DEPRESSED OR HOPELESS: MORE THAN HALF THE DAYS
SUM OF ALL RESPONSES TO PHQ QUESTIONS 1-9: 19
8. MOVING OR SPEAKING SO SLOWLY THAT OTHER PEOPLE COULD HAVE NOTICED. OR THE OPPOSITE, BEING SO FIGETY OR RESTLESS THAT YOU HAVE BEEN MOVING AROUND A LOT MORE THAN USUAL: NEARLY EVERY DAY
SUM OF ALL RESPONSES TO PHQ QUESTIONS 1-9: 19
SUM OF ALL RESPONSES TO PHQ QUESTIONS 1-9: 19
5. POOR APPETITE OR OVEREATING: NEARLY EVERY DAY
7. TROUBLE CONCENTRATING ON THINGS, SUCH AS READING THE NEWSPAPER OR WATCHING TELEVISION: MORE THAN HALF THE DAYS
4. FEELING TIRED OR HAVING LITTLE ENERGY: SEVERAL DAYS
SUM OF ALL RESPONSES TO PHQ9 QUESTIONS 1 & 2: 4
6. FEELING BAD ABOUT YOURSELF - OR THAT YOU ARE A FAILURE OR HAVE LET YOURSELF OR YOUR FAMILY DOWN: NEARLY EVERY DAY

## 2024-06-20 NOTE — PROGRESS NOTES
Patient updated PHQ-9 depression screening.  Score:19  Patient identifies the following symptoms, having little interest /pleasure in doing things, feeling down/ depressed, trouble falling asleep/ staying asleep, feeling tired/ having little energy, poor appetite/ overeating, feeling bad about himself letting himself down or others, trouble concentrating, moving /speaking so slowly/ fidgety that others have noticed.  Provider updated and will address  Paper copy scanned into chart.

## 2024-06-20 NOTE — PROGRESS NOTES
06/20/24   The patients primary care physician is Olga Hyatt, BETO - CNP    Ronal Hays is a 29 y.o.  male who presents in office today for follow up medication assisted treatment, substance use disorder.   Pt first est care on 8/8/2023- he was last seen on 1/24/2024  Denies using fentanyl or opiates. Has been using methamphetamines- smoking-  States he does not like meth, started using with a girlfriend- states \" it was either fentanyl or meth\"   States he will often stays up for 3-4 days after using. He does want to get clean, acknowledges he will go back to alf if he does not get sober. Has been told he hallucinates, states he saw his girlfriend go into psychosis while using meth and this has caused him to be fearful for himself.     UDS unacceptable- Positive for AMP, BUP, METH, THC   Last used meth yesterday    Denies taking any sl suboxone- states bup is positive from his last injection was in March    He is on parole, has been sent back 3 times for a week each time. He now has an ankle monitor. Plans to go today to enroll in IOP program at [place].     Currently staying with a friends mom, sober environment    [facility], he does not want sublocade due to pain and \"lumps\" on abd. He does however prefer injectable buprenorphine.     He did complete [facility] for tx of hep C- would like repeat labs     Pertinent Drug History  In [facility] 2013 to 2023  The patient started using opiates at the age of 11.   Quantity used daily: 80 dollars  Route of administration: injection  Date and time of last use: opiates-2012  Othersubstances: denies  Prior uses of buprenorphine/naltrexone: taking suboxone 8mg daily for the last several years while incarcerated. Pt states taking sl.  Psychiatric history: PTSD, bipolar 2 disorder  Past Medical History:   Diagnosis Date    Cyclothymic disorder     Depression     Hepatitis C     Substance abuse (HCC)          Social History

## 2024-06-20 NOTE — PROGRESS NOTES
Verbal order per VANDANA DANGELO CNP for urine drug screen. Positive for AMP, BUP, METH, THC. Verified results with Felicia ANAYA LPN.

## 2024-07-09 ENCOUNTER — NURSE ONLY (OUTPATIENT)
Dept: INTERNAL MEDICINE CLINIC | Age: 29
End: 2024-07-09
Payer: COMMERCIAL

## 2024-07-09 VITALS
WEIGHT: 196 LBS | HEIGHT: 70 IN | SYSTOLIC BLOOD PRESSURE: 139 MMHG | BODY MASS INDEX: 28.06 KG/M2 | HEART RATE: 107 BPM | RESPIRATION RATE: 16 BRPM | DIASTOLIC BLOOD PRESSURE: 82 MMHG

## 2024-07-09 DIAGNOSIS — F11.20 OPIOID USE DISORDER, SEVERE, DEPENDENCE (HCC): ICD-10-CM

## 2024-07-09 DIAGNOSIS — F11.20 SEVERE OPIOID USE DISORDER (HCC): Primary | ICD-10-CM

## 2024-07-09 LAB
ALCOHOL URINE: ABNORMAL
AMPHETAMINE SCREEN URINE: POSITIVE
BARBITURATE SCREEN URINE: ABNORMAL
BENZODIAZEPINE SCREEN, URINE: ABNORMAL
BUPRENORPHINE URINE: ABNORMAL
COCAINE METABOLITE SCREEN URINE: ABNORMAL
FENTANYL SCREEN, URINE: ABNORMAL
GABAPENTIN SCREEN, URINE: ABNORMAL
MDMA URINE: ABNORMAL
METHADONE SCREEN, URINE: ABNORMAL
METHAMPHETAMINE, URINE: ABNORMAL
OPIATE SCREEN URINE: ABNORMAL
OXYCODONE SCREEN URINE: ABNORMAL
PHENCYCLIDINE SCREEN URINE: ABNORMAL
PROPOXYPHENE SCREEN, URINE: ABNORMAL
SYNTHETIC CANNABINOIDS(K2) SCREEN, URINE: ABNORMAL
THC SCREEN, URINE: ABNORMAL
TRAMADOL SCREEN URINE: ABNORMAL
TRICYCLIC ANTIDEPRESSANTS, UR: ABNORMAL

## 2024-07-09 PROCEDURE — 99211 OFF/OP EST MAY X REQ PHY/QHP: CPT | Performed by: NURSE PRACTITIONER

## 2024-07-09 PROCEDURE — 80305 DRUG TEST PRSMV DIR OPT OBS: CPT | Performed by: NURSE PRACTITIONER

## 2024-07-09 RX ORDER — BUPRENORPHINE AND NALOXONE 8; 2 MG/1; MG/1
1 FILM, SOLUBLE BUCCAL; SUBLINGUAL DAILY
Qty: 6 FILM | Refills: 0 | OUTPATIENT
Start: 2024-07-09 | End: 2024-07-15

## 2024-07-09 RX ORDER — GABAPENTIN 400 MG/1
400 CAPSULE ORAL 3 TIMES DAILY
Qty: 18 CAPSULE | Refills: 0 | Status: SHIPPED | OUTPATIENT
Start: 2024-07-09 | End: 2024-07-15

## 2024-07-09 RX ORDER — AMITRIPTYLINE HYDROCHLORIDE 50 MG/1
50 TABLET, FILM COATED ORAL NIGHTLY
Qty: 6 TABLET | Refills: 0 | Status: SHIPPED | OUTPATIENT
Start: 2024-07-09 | End: 2024-07-15

## 2024-07-09 NOTE — PROGRESS NOTES
Per verbal order from Enriqueta Kelsey CNP this nurse called in script of Suboxone 8MG for 6 days, qty 6 , R-0 into [facility]. Patient aware.

## 2024-07-16 ENCOUNTER — TELEPHONE (OUTPATIENT)
Dept: INTERNAL MEDICINE CLINIC | Age: 29
End: 2024-07-16

## 2024-07-16 ENCOUNTER — OFFICE VISIT (OUTPATIENT)
Dept: INTERNAL MEDICINE CLINIC | Age: 29
End: 2024-07-16
Payer: COMMERCIAL

## 2024-07-16 VITALS
BODY MASS INDEX: 29.13 KG/M2 | SYSTOLIC BLOOD PRESSURE: 138 MMHG | WEIGHT: 203 LBS | DIASTOLIC BLOOD PRESSURE: 68 MMHG | HEART RATE: 72 BPM | RESPIRATION RATE: 16 BRPM

## 2024-07-16 DIAGNOSIS — F11.20 OPIOID USE DISORDER, SEVERE, DEPENDENCE (HCC): ICD-10-CM

## 2024-07-16 DIAGNOSIS — F11.20 SEVERE OPIOID USE DISORDER (HCC): Primary | ICD-10-CM

## 2024-07-16 DIAGNOSIS — F15.10 METHAMPHETAMINE USE (HCC): ICD-10-CM

## 2024-07-16 PROCEDURE — 80305 DRUG TEST PRSMV DIR OPT OBS: CPT | Performed by: NURSE PRACTITIONER

## 2024-07-16 PROCEDURE — G8428 CUR MEDS NOT DOCUMENT: HCPCS | Performed by: NURSE PRACTITIONER

## 2024-07-16 PROCEDURE — 99214 OFFICE O/P EST MOD 30 MIN: CPT | Performed by: NURSE PRACTITIONER

## 2024-07-16 PROCEDURE — G8417 CALC BMI ABV UP PARAM F/U: HCPCS | Performed by: NURSE PRACTITIONER

## 2024-07-16 PROCEDURE — 4004F PT TOBACCO SCREEN RCVD TLK: CPT | Performed by: NURSE PRACTITIONER

## 2024-07-16 RX ORDER — AMITRIPTYLINE HYDROCHLORIDE 50 MG/1
50 TABLET, FILM COATED ORAL NIGHTLY
Qty: 7 TABLET | Refills: 0 | Status: SHIPPED | OUTPATIENT
Start: 2024-07-16 | End: 2024-07-23

## 2024-07-16 RX ORDER — BUPRENORPHINE AND NALOXONE 8; 2 MG/1; MG/1
1 FILM, SOLUBLE BUCCAL; SUBLINGUAL DAILY
Qty: 7 FILM | Refills: 0 | Status: SHIPPED | OUTPATIENT
Start: 2024-07-16 | End: 2024-07-23

## 2024-07-16 RX ORDER — GABAPENTIN 400 MG/1
400 CAPSULE ORAL 3 TIMES DAILY
Qty: 21 CAPSULE | Refills: 0 | Status: CANCELLED | OUTPATIENT
Start: 2024-07-16 | End: 2024-07-23

## 2024-07-16 NOTE — PROGRESS NOTES
07/16/24   The patients primary care physician is Olga Hyatt, [facility]O - [facility]    Ronal Hays is a 29 y.o.  male who presents in office today for follow up medication assisted treatment, substance use disorder.   Pt first est care on 8/8/2023- he was lost in f/u from Jan 2024 to June 20 2024  No show x 3 since that time- discussed with pt    He has been struggling with methamphetamine use.     UDS unacceptable -Positive for AMP, BUP, METH.   Admits to use of meth \"its been a few days\"     Pt is back to work- day shift- 5 days a week, has an ankle monitor in place    He completed assessment for [facility]- has not started groups yet.     Reports he has taking elavil for an extended period of time and unable to sleep with out it. I will continue however will no longer prescribe gabapentin until UDS acceptable      Pertinent Drug History  In [facility] 2013 to 2023  The patient started using opiates at the age of 11.   Quantity used daily: 80 dollars  Route of administration: injection  Date and time of last use: opiates-2012  Othersubstances: denies  Prior uses of buprenorphine/naltrexone: taking suboxone 8mg daily for the last several years while incarcerated. Pt states taking sl.  Psychiatric history: PTSD, bipolar 2 disorder  Past Medical History:   Diagnosis Date    Cyclothymic disorder     Depression     Hepatitis C     Substance abuse (HCC)          Social History     Socioeconomic History    Marital status: Single     Spouse name: Not on file    Number of children: Not on file    Years of education: Not on file    Highest education level: Not on file   Occupational History    Not on file   Tobacco Use    Smoking status: Every Day     Current packs/day: 1.00     Types: Cigarettes    Smokeless tobacco: Never   Substance and Sexual Activity    Alcohol use: Never    Drug use: Yes     Types: Marijuana (Weed), Methamphetamines (Crystal Meth)     Comment: Last used Meth and THC- 6/19/24    Sexual activity: Not

## 2024-07-16 NOTE — TELEPHONE ENCOUNTER
On 6/20/24 pt was to get labs done unfortunately was unable to due to him going through withdraw. Will get labs when pt is in office next.

## 2024-07-16 NOTE — PROGRESS NOTES
Verbal order per VANDANA DANGELO CNP for urine drug screen. Positive for AMP, BUP, METH. Verified results with Felicia ANAYA LPN.

## 2024-07-23 ENCOUNTER — TELEPHONE (OUTPATIENT)
Dept: INTERNAL MEDICINE CLINIC | Age: 29
End: 2024-07-23

## 2024-07-26 ENCOUNTER — TELEPHONE (OUTPATIENT)
Dept: INTERNAL MEDICINE CLINIC | Age: 29
End: 2024-07-26

## 2024-09-25 ENCOUNTER — SOCIAL WORK (OUTPATIENT)
Dept: INTERNAL MEDICINE CLINIC | Age: 29
End: 2024-09-25

## 2024-12-03 NOTE — PROGRESS NOTES
Patient stated he has not consumed any Fentanyl or opiates in the last 2 weeks. Patient was educated on the risk of precipitated withdrawals symptoms that could take place with receiving the injections. Patient verbalized understanding. Sublocade 100 mg SQ injection given into RLQ. Patient tolerated well. No adverse reaction noticed. [facility] [place]

## 2025-01-23 ENCOUNTER — OFFICE VISIT (OUTPATIENT)
Dept: INTERNAL MEDICINE CLINIC | Age: 30
End: 2025-01-23
Payer: COMMERCIAL

## 2025-01-23 VITALS
DIASTOLIC BLOOD PRESSURE: 92 MMHG | RESPIRATION RATE: 18 BRPM | BODY MASS INDEX: 30.35 KG/M2 | SYSTOLIC BLOOD PRESSURE: 138 MMHG | WEIGHT: 212 LBS | HEART RATE: 76 BPM | HEIGHT: 70 IN

## 2025-01-23 DIAGNOSIS — F11.20 SEVERE OPIOID USE DISORDER (HCC): Primary | ICD-10-CM

## 2025-01-23 LAB
ALCOHOL URINE: ABNORMAL
AMPHETAMINE SCREEN URINE: ABNORMAL
BARBITURATE SCREEN URINE: ABNORMAL
BENZODIAZEPINE SCREEN, URINE: ABNORMAL
BUPRENORPHINE URINE: ABNORMAL
COCAINE METABOLITE SCREEN URINE: ABNORMAL
FENTANYL SCREEN, URINE: ABNORMAL
GABAPENTIN SCREEN, URINE: ABNORMAL
MDMA, URINE: ABNORMAL
METHADONE SCREEN, URINE: ABNORMAL
METHAMPHETAMINE, URINE: ABNORMAL
OPIATE SCREEN URINE: ABNORMAL
OXYCODONE SCREEN URINE: ABNORMAL
PHENCYCLIDINE SCREEN URINE: ABNORMAL
PROPOXYPHENE SCREEN, URINE: ABNORMAL
SYNTHETIC CANNABINOIDS(K2) SCREEN, URINE: ABNORMAL
THC SCREEN, URINE: ABNORMAL
TRAMADOL SCREEN URINE: ABNORMAL
TRICYCLIC ANTIDEPRESSANTS, UR: ABNORMAL

## 2025-01-23 PROCEDURE — 80305 DRUG TEST PRSMV DIR OPT OBS: CPT | Performed by: NURSE PRACTITIONER

## 2025-01-23 PROCEDURE — G8417 CALC BMI ABV UP PARAM F/U: HCPCS | Performed by: NURSE PRACTITIONER

## 2025-01-23 PROCEDURE — 99214 OFFICE O/P EST MOD 30 MIN: CPT | Performed by: NURSE PRACTITIONER

## 2025-01-23 PROCEDURE — G8427 DOCREV CUR MEDS BY ELIG CLIN: HCPCS | Performed by: NURSE PRACTITIONER

## 2025-01-23 PROCEDURE — 4004F PT TOBACCO SCREEN RCVD TLK: CPT | Performed by: NURSE PRACTITIONER

## 2025-01-23 RX ORDER — BUPRENORPHINE AND NALOXONE 12; 3 MG/1; MG/1
1 FILM, SOLUBLE BUCCAL; SUBLINGUAL DAILY
Qty: 14 FILM | Refills: 0 | Status: SHIPPED | OUTPATIENT
Start: 2025-01-23 | End: 2025-02-06

## 2025-01-23 RX ORDER — BUPRENORPHINE AND NALOXONE 12; 3 MG/1; MG/1
1 FILM, SOLUBLE BUCCAL; SUBLINGUAL DAILY
COMMUNITY
End: 2025-01-23 | Stop reason: ALTCHOICE

## 2025-01-23 NOTE — PROGRESS NOTES
01/23/25   The patients primary care physician is Olga Hyatt, [facility] - [facility]    Ronal Hays is a 29 y.o.  male who presents in office today for follow up medication assisted treatment, substance use disorder.   Pt initially est care 8/2023- lost in follow up several times. He was last seen in office 7/2024    He was buying suboxone off the streets until Sept and has since been incarcerated. He was released for 4 days and now back in custody. Pt has office in office today with him. He is restrained. Pt will be returning to detention Thursday for 6 months.     Pt states [facility] has been prescribing him suboxone 12mg daily while incarcerated. I am unable to verify this information through [facility]. Reports they will continue medication in detention if he brings a script in with him. [facility] states they will administer also however they do not prescribe.   Pt states his last dose was yesterday, reports he is starting to feel symptoms of withdrawal    UDS pos bup      Pertinent Drug History  In detention 2013 to 2023  The patient started using opiates at the age of 11.   Quantity used daily: 80 dollars  Route of administration: injection  Date and time of last use: opiates-2012  Othersubstances: denies  Prior uses of buprenorphine/naltrexone: taking suboxone 8mg daily for the last several years while incarcerated. Pt states taking sl.  Psychiatric history: PTSD, bipolar 2 disorder       Past Medical History:   Diagnosis Date    Cyclothymic disorder     Depression     Hepatitis C     Substance abuse (HCC)          Social History     Socioeconomic History    Marital status: Single     Spouse name: Not on file    Number of children: Not on file    Years of education: Not on file    Highest education level: Not on file   Occupational History    Not on file   Tobacco Use    Smoking status: Every Day     Current packs/day: 1.00     Types: Cigarettes    Smokeless tobacco: Never   Substance and Sexual Activity    Alcohol use:

## 2025-08-07 ENCOUNTER — SOCIAL WORK (OUTPATIENT)
Age: 30
End: 2025-08-07

## 2025-08-11 ENCOUNTER — OFFICE VISIT (OUTPATIENT)
Age: 30
End: 2025-08-11
Payer: MEDICAID

## 2025-08-11 ENCOUNTER — SOCIAL WORK (OUTPATIENT)
Age: 30
End: 2025-08-11

## 2025-08-11 VITALS
RESPIRATION RATE: 20 BRPM | HEIGHT: 70 IN | HEART RATE: 64 BPM | WEIGHT: 220 LBS | DIASTOLIC BLOOD PRESSURE: 77 MMHG | SYSTOLIC BLOOD PRESSURE: 127 MMHG | BODY MASS INDEX: 31.5 KG/M2

## 2025-08-11 DIAGNOSIS — F11.20 SEVERE OPIOID USE DISORDER (HCC): Primary | ICD-10-CM

## 2025-08-11 DIAGNOSIS — F31.81 BIPOLAR II DISORDER WITH RAPID CYCLING (HCC): ICD-10-CM

## 2025-08-11 DIAGNOSIS — F43.10 PTSD (POST-TRAUMATIC STRESS DISORDER): ICD-10-CM

## 2025-08-11 PROCEDURE — 80305 DRUG TEST PRSMV DIR OPT OBS: CPT | Performed by: NURSE PRACTITIONER

## 2025-08-11 PROCEDURE — 99214 OFFICE O/P EST MOD 30 MIN: CPT | Performed by: NURSE PRACTITIONER

## 2025-08-11 RX ORDER — BUPRENORPHINE 96 MG/.27ML
96 INJECTION SUBCUTANEOUS
COMMUNITY

## 2025-08-11 RX ORDER — AMITRIPTYLINE HYDROCHLORIDE 50 MG/1
50 TABLET ORAL NIGHTLY
Qty: 30 TABLET | Refills: 0 | Status: SHIPPED | OUTPATIENT
Start: 2025-08-11 | End: 2025-09-10

## 2025-08-11 RX ORDER — BUPRENORPHINE 96 MG/.27ML
96 INJECTION SUBCUTANEOUS
Qty: 0.27 ML | Refills: 0 | Status: SHIPPED | OUTPATIENT
Start: 2025-08-11 | End: 2025-09-10

## 2025-08-11 SDOH — ECONOMIC STABILITY: FOOD INSECURITY: WITHIN THE PAST 12 MONTHS, THE FOOD YOU BOUGHT JUST DIDN'T LAST AND YOU DIDN'T HAVE MONEY TO GET MORE.: NEVER TRUE

## 2025-08-11 SDOH — ECONOMIC STABILITY: FOOD INSECURITY: WITHIN THE PAST 12 MONTHS, YOU WORRIED THAT YOUR FOOD WOULD RUN OUT BEFORE YOU GOT MONEY TO BUY MORE.: NEVER TRUE

## 2025-08-11 ASSESSMENT — PATIENT HEALTH QUESTIONNAIRE - PHQ9
SUM OF ALL RESPONSES TO PHQ QUESTIONS 1-9: 2
SUM OF ALL RESPONSES TO PHQ QUESTIONS 1-9: 2
8. MOVING OR SPEAKING SO SLOWLY THAT OTHER PEOPLE COULD HAVE NOTICED. OR THE OPPOSITE, BEING SO FIGETY OR RESTLESS THAT YOU HAVE BEEN MOVING AROUND A LOT MORE THAN USUAL: NOT AT ALL
6. FEELING BAD ABOUT YOURSELF - OR THAT YOU ARE A FAILURE OR HAVE LET YOURSELF OR YOUR FAMILY DOWN: NOT AT ALL
3. TROUBLE FALLING OR STAYING ASLEEP: MORE THAN HALF THE DAYS
SUM OF ALL RESPONSES TO PHQ QUESTIONS 1-9: 2
10. IF YOU CHECKED OFF ANY PROBLEMS, HOW DIFFICULT HAVE THESE PROBLEMS MADE IT FOR YOU TO DO YOUR WORK, TAKE CARE OF THINGS AT HOME, OR GET ALONG WITH OTHER PEOPLE: NOT DIFFICULT AT ALL
1. LITTLE INTEREST OR PLEASURE IN DOING THINGS: NOT AT ALL
2. FEELING DOWN, DEPRESSED OR HOPELESS: NOT AT ALL
4. FEELING TIRED OR HAVING LITTLE ENERGY: NOT AT ALL
SUM OF ALL RESPONSES TO PHQ QUESTIONS 1-9: 2
7. TROUBLE CONCENTRATING ON THINGS, SUCH AS READING THE NEWSPAPER OR WATCHING TELEVISION: NOT AT ALL
5. POOR APPETITE OR OVEREATING: NOT AT ALL
9. THOUGHTS THAT YOU WOULD BE BETTER OFF DEAD, OR OF HURTING YOURSELF: NOT AT ALL

## 2025-08-11 ASSESSMENT — ENCOUNTER SYMPTOMS
ABDOMINAL PAIN: 0
CONSTIPATION: 0
DIARRHEA: 0
NAUSEA: 0
VOMITING: 0
WHEEZING: 0
SHORTNESS OF BREATH: 0
CHEST TIGHTNESS: 0
COUGH: 0

## 2025-08-12 ENCOUNTER — SOCIAL WORK (OUTPATIENT)
Age: 30
End: 2025-08-12